# Patient Record
Sex: MALE | Race: AMERICAN INDIAN OR ALASKA NATIVE | Employment: FULL TIME | URBAN - METROPOLITAN AREA
[De-identification: names, ages, dates, MRNs, and addresses within clinical notes are randomized per-mention and may not be internally consistent; named-entity substitution may affect disease eponyms.]

---

## 2019-07-23 ENCOUNTER — HOSPITAL ENCOUNTER (EMERGENCY)
Age: 24
Discharge: HOME | End: 2019-07-23
Attending: EMERGENCY MEDICINE
Payer: COMMERCIAL

## 2019-07-23 ENCOUNTER — APPOINTMENT (OUTPATIENT)
Dept: GENERAL RADIOLOGY | Age: 24
End: 2019-07-23
Payer: COMMERCIAL

## 2019-07-23 VITALS
DIASTOLIC BLOOD PRESSURE: 81 MMHG | WEIGHT: 133 LBS | HEIGHT: 67 IN | RESPIRATION RATE: 16 BRPM | BODY MASS INDEX: 20.88 KG/M2 | TEMPERATURE: 97.7 F | HEART RATE: 83 BPM | SYSTOLIC BLOOD PRESSURE: 129 MMHG

## 2019-07-23 DIAGNOSIS — N34.2 URETHRITIS: ICD-10-CM

## 2019-07-23 DIAGNOSIS — K52.9 GASTROENTERITIS: Primary | ICD-10-CM

## 2019-07-23 LAB
AMORPHOUS: ABNORMAL /HPF
BACTERIA: ABNORMAL /HPF
BILIRUBIN URINE: NEGATIVE
BLOOD, URINE: NEGATIVE
CLARITY: ABNORMAL
COLOR: YELLOW
GLUCOSE URINE: NEGATIVE MG/DL
KETONES, URINE: NEGATIVE MG/DL
LEUKOCYTE ESTERASE, URINE: NEGATIVE
MICROSCOPIC EXAMINATION: YES
NITRITE, URINE: NEGATIVE
PH UA: 7.5 (ref 5–8)
PROTEIN UA: NEGATIVE MG/DL
RBC UA: ABNORMAL /HPF (ref 0–2)
SPECIFIC GRAVITY UA: 1.01 (ref 1–1.03)
URINE TYPE: ABNORMAL
UROBILINOGEN, URINE: 0.2 E.U./DL
WBC UA: ABNORMAL /HPF (ref 0–5)

## 2019-07-23 PROCEDURE — 87491 CHLMYD TRACH DNA AMP PROBE: CPT

## 2019-07-23 PROCEDURE — 74018 RADEX ABDOMEN 1 VIEW: CPT

## 2019-07-23 PROCEDURE — 99284 EMERGENCY DEPT VISIT MOD MDM: CPT

## 2019-07-23 PROCEDURE — 81001 URINALYSIS AUTO W/SCOPE: CPT

## 2019-07-23 PROCEDURE — 87591 N.GONORRHOEAE DNA AMP PROB: CPT

## 2019-07-23 RX ORDER — AZITHROMYCIN 250 MG/1
1000 TABLET, FILM COATED ORAL ONCE
Qty: 4 TABLET | Refills: 0 | Status: SHIPPED | OUTPATIENT
Start: 2019-07-23 | End: 2019-07-23

## 2019-07-23 RX ORDER — DICYCLOMINE HYDROCHLORIDE 10 MG/1
10 CAPSULE ORAL
Qty: 30 CAPSULE | Refills: 0 | Status: SHIPPED | OUTPATIENT
Start: 2019-07-23 | End: 2020-07-22

## 2019-07-23 RX ORDER — METRONIDAZOLE 500 MG/1
2000 TABLET ORAL ONCE
Qty: 4 TABLET | Refills: 0 | Status: SHIPPED | OUTPATIENT
Start: 2019-07-23 | End: 2019-07-23

## 2019-07-23 RX ORDER — CIPROFLOXACIN 500 MG/1
500 TABLET, FILM COATED ORAL ONCE
Qty: 1 TABLET | Refills: 0 | Status: SHIPPED | OUTPATIENT
Start: 2019-07-23 | End: 2019-07-23

## 2019-07-23 SDOH — HEALTH STABILITY: MENTAL HEALTH: HOW OFTEN DO YOU HAVE A DRINK CONTAINING ALCOHOL?: NEVER

## 2019-07-23 ASSESSMENT — PAIN DESCRIPTION - DESCRIPTORS: DESCRIPTORS: ACHING

## 2019-07-23 ASSESSMENT — PAIN SCALES - GENERAL: PAINLEVEL_OUTOF10: 8

## 2019-07-23 ASSESSMENT — PAIN DESCRIPTION - PAIN TYPE: TYPE: ACUTE PAIN

## 2019-07-23 ASSESSMENT — PAIN DESCRIPTION - LOCATION: LOCATION: ABDOMEN

## 2019-07-23 ASSESSMENT — PAIN DESCRIPTION - FREQUENCY: FREQUENCY: CONTINUOUS

## 2019-07-23 NOTE — ED TRIAGE NOTES
Pt c/o epigastric pain for four days after eating [employer].  + diarrhea several days ago, denies emesis. + dysuria.  EMD here

## 2019-07-23 NOTE — ED NOTES
Patient given d/c instructions with return verbalization including medications. Emphasis on f/u, to return with worsening s/s. Pt ambulated to lobby with steady gait.      Nimesh Aleman RN  07/23/19 9819

## 2019-07-26 LAB
C. TRACHOMATIS DNA ,URINE: NEGATIVE
N. GONORRHOEAE DNA, URINE: NEGATIVE

## 2020-10-23 LAB — H PYLORI ANTIGEN STOOL: NEGATIVE

## 2021-05-28 ENCOUNTER — APPOINTMENT (OUTPATIENT)
Dept: GENERAL RADIOLOGY | Age: 26
End: 2021-05-28
Payer: COMMERCIAL

## 2021-05-28 ENCOUNTER — HOSPITAL ENCOUNTER (EMERGENCY)
Age: 26
Discharge: HOME | End: 2021-05-28
Attending: EMERGENCY MEDICINE
Payer: COMMERCIAL

## 2021-05-28 VITALS
SYSTOLIC BLOOD PRESSURE: 112 MMHG | OXYGEN SATURATION: 99 % | BODY MASS INDEX: 23.1 KG/M2 | TEMPERATURE: 98 F | WEIGHT: 147.2 LBS | HEART RATE: 89 BPM | DIASTOLIC BLOOD PRESSURE: 68 MMHG | HEIGHT: 67 IN | RESPIRATION RATE: 14 BRPM

## 2021-05-28 DIAGNOSIS — S90.212A HEMATOMA, SUBUNGUAL, GREAT TOE, LEFT, INITIAL ENCOUNTER: ICD-10-CM

## 2021-05-28 DIAGNOSIS — S29.019D THORACIC MYOFASCIAL STRAIN, SUBSEQUENT ENCOUNTER: ICD-10-CM

## 2021-05-28 DIAGNOSIS — S90.212A CONTUSION OF LEFT GREAT TOE WITH DAMAGE TO NAIL, INITIAL ENCOUNTER: Primary | ICD-10-CM

## 2021-05-28 PROCEDURE — 73660 X-RAY EXAM OF TOE(S): CPT

## 2021-05-28 PROCEDURE — 99283 EMERGENCY DEPT VISIT LOW MDM: CPT

## 2021-05-28 PROCEDURE — 6370000000 HC RX 637 (ALT 250 FOR IP): Performed by: EMERGENCY MEDICINE

## 2021-05-28 RX ORDER — IBUPROFEN 600 MG/1
600 TABLET ORAL ONCE
Status: COMPLETED | OUTPATIENT
Start: 2021-05-28 | End: 2021-05-28

## 2021-05-28 RX ADMIN — IBUPROFEN 600 MG: 600 TABLET ORAL at 01:02

## 2021-05-28 ASSESSMENT — PAIN DESCRIPTION - ONSET
ONSET: SUDDEN
ONSET_2: PROGRESSIVE

## 2021-05-28 ASSESSMENT — PAIN DESCRIPTION - LOCATION
LOCATION_2: BACK
LOCATION: TOE (COMMENT WHICH ONE)

## 2021-05-28 ASSESSMENT — PAIN DESCRIPTION - ORIENTATION
ORIENTATION_2: MID
ORIENTATION: RIGHT

## 2021-05-28 ASSESSMENT — PAIN DESCRIPTION - INTENSITY
RATING_2: 4
RATING_2: 5

## 2021-05-28 ASSESSMENT — PAIN DESCRIPTION - PROGRESSION
CLINICAL_PROGRESSION_2: GRADUALLY WORSENING
CLINICAL_PROGRESSION: NOT CHANGED
CLINICAL_PROGRESSION: GRADUALLY IMPROVING
CLINICAL_PROGRESSION_2: GRADUALLY IMPROVING

## 2021-05-28 ASSESSMENT — PAIN DESCRIPTION - DESCRIPTORS
DESCRIPTORS: THROBBING
DESCRIPTORS_2: ACHING

## 2021-05-28 ASSESSMENT — PAIN SCALES - GENERAL
PAINLEVEL_OUTOF10: 5
PAINLEVEL_OUTOF10: 8
PAINLEVEL_OUTOF10: 8

## 2021-05-28 ASSESSMENT — PAIN DESCRIPTION - PAIN TYPE: TYPE: ACUTE PAIN

## 2021-05-28 ASSESSMENT — PAIN DESCRIPTION - DURATION: DURATION_2: INTERMITTENT

## 2021-05-28 ASSESSMENT — PAIN DESCRIPTION - FREQUENCY: FREQUENCY: CONTINUOUS

## 2021-05-28 NOTE — ED TRIAGE NOTES
C/O injury to right great toe while playing soccer yesterday. Also C/O pain in mid back. States he has had back pain x 2 months. Was seen at another [facility] and put on muscle relaxers.  Also has seen chiropractor and massage therapist.

## 2021-05-28 NOTE — ED PROVIDER NOTES
[facility] EMERGENCY DEPT VISIT      Patient Identification  Salbador Arizmendi is a 31 y.o. male. Chief Complaint   Toe Injury (Right great toe) and Back Pain (mid back pain x 2 months)      History of Present Illness: This is a  31 y.o. male who presents ambulatory  to the ED with complaints of left great toe pain after getting it stepped on while playing soccer this evening. Noted blood coming out from under his toenail. Hurts to touch. Able to ambulate. Also related upper back pain that radiates to shoulders and neck that has been an ongoing problem for 2 months. No new injury. Hurts to soraida certain ways. Seen in ED for this and placed on NSAIDS and muscle relaxants but states exercises help the most with the pain. He has seen chiropractor. History reviewed. No pertinent past medical history. History reviewed. No pertinent surgical history. No current facility-administered medications for this encounter. Current Outpatient Medications:     dicyclomine (BENTYL) 10 MG capsule, Take 1 capsule by mouth 4 times daily (before meals and nightly), Disp: 30 capsule, Rfl: 0    No Known Allergies    Social History     Socioeconomic History    Marital status: Single     Spouse name: Not on file    Number of children: Not on file    Years of education: Not on file    Highest education level: Not on file   Occupational History    Not on file   Tobacco Use    Smoking status: Never Smoker   Vaping Use    Vaping Use: Never used   Substance and Sexual Activity    Alcohol use: Never    Drug use: Never    Sexual activity: Not on file   Other Topics Concern    Not on file   Social History Narrative    Not on file     Social Determinants of Health     Financial Resource Strain:     Difficulty of Paying Living Expenses:    Food Insecurity:     Worried About Running Out of Food in the Last Year:     [address] in the Last Year:    Transportation Needs:     Lack of Transportation (Medical):      Lack of Transportation (Non-Medical):    Physical Activity:     Days of Exercise per Week:     Minutes of Exercise per Session:    Stress:     Feeling of Stress :    Social Connections:     Frequency of Communication with Friends and Family:     Frequency of Social Gatherings with Friends and Family:     Attends Yarsani Services:     Active Member of Clubs or Organizations:     Attends Club or Organization Meetings:     Marital Status:    Intimate Partner Violence:     Fear of Current or Ex-Partner:     Emotionally Abused:     Physically Abused:     Sexually Abused:        Nursing Notes Reviewed      ROS:  General: no fever  ENT: no sinus congestion, no sore throat  RESP: no cough, no shortness of breath  CARDIAC: no chest pain  GI: no abdominal pain, no vomiting, no diarrhea  Musculoskeletal: no arthralgia, + myalgia, + back pain,  no joint swelling  NEURO: no headache, no numbness, no weakness, no dizziness  DERM: no rash, no erythema, + ecchymosis, no wounds      PHYSICAL EXAM:  GENERAL APPEARANCE: Valeri Cowan is in no acute respiratory distress. Awake and alert. VITAL SIGNS:   ED Triage Vitals [05/28/21 0009]   Enc Vitals Group      /68      Pulse 92      Resp 14      Temp 98 °F (36.7 °C)      Temp Source Oral      SpO2 99 %      Weight 147 lb 3.2 oz (66.8 kg)      Height 5' 7\" (1.702 m)      Head Circumference       Peak Flow       Pain Score       Pain Loc       Pain Edu? Excl. in [address]? HEAD: Normocephalic, atraumatic. EYES:  Extraocular muscles are intact. Conjunctivas are pink. Negative scleral icterus. ENT:  Mucous membranes are moist.  Pharynx without erythema or exudates. NECK: Nontender and supple. CHEST: Clear to auscultation bilaterally. No rales, rhonchi, or wheezing. HEART:  Regular rate and rhythm. No murmurs. Strong and equal pulses in the upper and lower extremities. ABDOMEN: Soft,  nondistended, positive bowel sounds. abdomen is nontender.   MUSCULOSKELETAL: Active range of motion of the upper and lower extremities. No edema. Left great toe tender with mild swelling. Small proximal subungal hematoma. No active bleeding. No metatarsal tenderness. Upper thoracic spine tenderness midline and paravertebral but with good ROM. NEUROLOGICAL: Awake, alert and oriented x 3. Power intact in the upper and lower extremities. DERMATOLOGIC: No petechiae, rashes, or ecchymoses. ED COURSE AND MEDICAL DECISION MAKING:      Radiology:  All plain films have been evaluated by myself. They may have been overread by radiologist as noted in chart. Other radiologic studies (i.e. CT, MRI, ultrasounds, etc ) have been interpreted by radiologist.     XR TOE LEFT (MIN 2 VIEWS)   Final Result     No acute fracture. Labs:  No results found for this visit on 05/28/21. Treatment in the department:  Patient received   Medications   ibuprofen (ADVIL;MOTRIN) tablet 600 mg (600 mg Oral Given 5/28/21 0102)          Medical decision making:  Patient presents with left great toe pain after a crush injury. There is a very small subungual hematoma that is too small for drainage. X-rays are negative for fracture. He was offered a postop shoe for comfort and declined. Ice elevation and ibuprofen. He is also complaining of chronic upper back pain which has been ongoing issue for 2 months. There are no radicular symptoms into his arms or legs. The pain is worse with movement however he is exhibiting good range of motion here and states that he just got injured playing soccer and therefore I do not feel that he requires any x-ray imaging. This is likely muscular in nature and he is already on muscle relaxants. I have advised him to follow-up with PCP or orthopedics and perhaps physical therapy might be helpful.   I estimate there is LOW risk for FRACTURE, DISLOCATION, , SEPTIC ARTHRITIS, OSTEOMYELITIS, TENDON OR NEUROVASCULAR INJURY, thus I consider the discharge disposition reasonable. Elias Corcoran and I have discussed the diagnosis and risks, and we agree with discharging home to follow-up with their primary doctor or the referral orthopedist. We also discussed returning to the Emergency Department immediately if new or worsening symptoms occur. Clinical Impression:  1. Contusion of left great toe with damage to nail, initial encounter    2. Hematoma, subungual, great toe, left, initial encounter    3. Thoracic myofascial strain, subsequent encounter        Dispo:  Patient will be discharged  at this time. Patient was informed of this decision and agrees with plan. I have discussed lab and xray findings with patient and they understand. Questions were answered to the best of my ability. Discharge vitals:  Blood pressure 112/68, pulse 89, temperature 98 °F (36.7 °C), temperature source Oral, resp. rate 14, height 5' 7\" (1.702 m), weight 147 lb 3.2 oz (66.8 kg), SpO2 99 %. Prescriptions given:   Discharge Medication List as of 5/28/2021  1:22 AM            This chart was created using dragon voice recognition software.         Sneha Valenzuela MD  05/28/21 1017

## 2021-07-21 ENCOUNTER — HOSPITAL ENCOUNTER (OUTPATIENT)
Dept: PHYSICAL THERAPY | Age: 26
Setting detail: THERAPIES SERIES
Discharge: HOME | End: 2021-07-21
Payer: MEDICAID

## 2021-07-21 PROCEDURE — 97112 NEUROMUSCULAR REEDUCATION: CPT

## 2021-07-21 PROCEDURE — 97110 THERAPEUTIC EXERCISES: CPT

## 2021-07-21 PROCEDURE — 97161 PT EVAL LOW COMPLEX 20 MIN: CPT

## 2021-07-21 NOTE — PLAN OF CARE
[facility] - Outpatient Rehabilitation and Therapy,  [facility]  [address] [facility], [facility]  Phone: [phone]   Fax:     [phone]          Physical Therapy Certification    Dear Referring Practitioner: Dr. Vernon Banks,    We had the pleasure of evaluating the following patient for physical therapy services at [facility] and [facility]. A summary of our findings can be found in the initial assessment below. This includes our plan of care. If you have any questions or concerns regarding these findings, please do not hesitate to contact me at the office phone number checked above. Thank you for the referral.       Physician Signature:_______________________________Date:__________________  By signing above (or electronic signature), therapists plan is approved by physician            Patient: Ana Maria Navarro   : 1995   MRN: [id number]  Referring Physician: Referring Practitioner: Dr. Vernon Banks      Evaluation Date: 2021      Medical Diagnosis Information:  Diagnosis: Neck and back pain (M54.2 and M54.5)   Treatment Diagnosis: Neck pain (M54.2) Thoracic pain (M54.6), Lumbar pain (M54.5), Postural dysfunction (R29.3)                                         Insurance information: PT Insurance Information: [facility]    Precautions/ Contra-indications: none  Latex Allergy:  [x]NO      []YES  Preferred Language for Healthcare:   [x]English       []other:    C-SSRS Triggered by Intake questionnaire (Past 2 wk assessment ):   [x] No, Questionnaire did not trigger screening.   [] Yes, Patient intake triggered C-SSRS Screening      [] C-SSRS Screening completed  [] PCP notified via Epic     SUBJECTIVE: Patient stated complaint: Pt reports he started having pain in his neck/upper back 3-4 months ago. Has gone to ER a couple of times for this. Reports this is the first episode he has had of this pain. States he thinks it came on after doing a specific back work out and never got better. States he doesn't find much that gives him relief of pain. Has not been working out to avoid reinjury. 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[employer] Status/Prior Level of Function: Independent with ADLs, goes to gym, walking    OBJECTIVE:   Palpation: Tight bilat UTs, tender between scapulae    Functional Mobility/Transfers: no deficits    Posture: fwd head, rounded shoulders    Bandages/Dressings/Incisions: NA    Gait: (include devices/WB status):  WNL    CERV ROM     Cervical Flexion 60 deg pain R side    Cervical Extension 50 deg     Left Right   Cervical SB 50 deg stretch 40 deg stretch   Cervical rotation 75 deg 80 deg   Quadrant neg neg   Dorsal Glide      UE ROM Left Right   Shoulder Flex     Shoulder Abd     Shoulder ER     Shoulder IR     Elbow flex/ext     Wrist flex/ext/pro/sup     Finger flex/ext/opposition     Shoulder AROM WNL w OP     UE Strength  Left Right   Shoulder Flex 4+/5 4+/5   Shoulder Scap     Shoulder ABd (C5 Axillary) 4/5 4/5   Shoulder ER  4+/5 4+/5   Shoulder IR 4+/5 4+/5   Elbow Flex (C5 Musc) 4+/5 4+/5   Elbow Ext (C7 Radial) 4+/5 4+/5        Rhomboids 4-/5 4-/5   Mid trap 4-/5 4-/5   Low trap 3+/5 3+/5                     Reflexes Normal Abnormal Comments               S1-2 Seated achilles [] []    S1-2 Prone knee bend [] []    L3-4 Patellar tendon [] []    C5-6 Biceps [] []    C6 Brachioradialis [] []    C7-8 Triceps [] []      Reflexes/Sensation:    [x]Dermatomes/Myotomes intact    [x]Reflexes equal and normal bilaterally   []Other:    Joint Psychological Disorders  []Anxiety (F41.9)  []Depression (F32.9)   []Other:   []Other:          Barriers to/and or personal factors that will affect rehab potential:              []Age  []Sex   []Smoker              []Motivation/Lack of Motivation                        []Co-Morbidities              []Cognitive Function, education/learning barriers              []Environmental, home barriers              [x]profession/work barriers  []past PT/medical experience  []other:  Justification:  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 [] Falls Risk assessed and Patient requires intervention due to being higher risk   TUG score (>12s at risk):     [] Falls education provided, including     ASSESSMENT: Pt demos decreased postural awareness and decreased muscular endurance for seated activities. Would benefit from skilled PT to address these deficits to return to PLOF.     Functional Impairments:     [x]Noted cervical/thoracic/GHJ joint hypomobility   []Noted cervical/thoracic/GHJ joint hypermobility   [x]Decreased cervical/UE functional ROM   []Noted Headache pain aggravated by neck movements with/without dizziness   []Abnormal reflexes/sensation/myotomal/dermatomal deficits   [x]Decreased DCF control or ability to hold head up   []Decreased RC/scapular/core strength and neuromuscular control    [x]Decreased UE functional strength   [x]other: Dec postural strength     Functional Activity Limitations (from functional questionnaire and intake)   [x]Reduced ability to tolerate prolonged functional positions   []Reduced ability or difficulty with changes of positions or transfers between positions   [x]Reduced ability to maintain good posture and demonstrate good body mechanics with sitting, bending, and lifting   [x] Reduced ability or tolerance with driving and/or computer work   [x]Reduced ability to perform lifting, reaching, carrying tasks   []Reduced ability to concentrate   []Reduced ability to sleep    []Reduced ability to tolerate any impact through UE or spine   []Reduced ability to ambulate prolonged functional periods/distances   []other:    Participation Restrictions   []Reduced participation in self care activities   []Reduced participation in home management activities   [x]Reduced participation in work activities   [x]Reduced participation in social activities. [x]Reduced participation in sport/recreational activities. Classification/Subgrouping:   []signs/symptoms consistent with neck pain with mobility deficits     []signs/symptoms consistent with neck pain with movement coordinated impairments    []signs/symptoms consistent with neck pain with radiating pain    []signs/symptoms consistent with neck pain with headaches (cervicogenic)    []Signs/symptoms consistent with nerve root involvement including myotome & dermatome dysfunction   []sign/symptoms consistent with facet dysfunction of cervical and thoracic spine    []signs/symptoms consistent suggesting central cord compression/UMN syndromes   []signs/symptoms consistent with discogenic cervical pain   []signs/symptoms consistent with rib dysfunction   [x]signs/symptoms consistent with postural dysfunction   []signs/symptoms consistent with shoulder pathology    []signs/symptoms consistent with post-surgical status including decreased ROM, strength and function.    []signs/symptoms consistent with pathology which may benefit from Dry Needling   []signs/symptoms which may limit the use of advanced manual therapy techniques: (Elevated CV risk profile, recent trauma, intolerance to end range positions, prior TIA, visual issues, UE neurological compromise )     Prognosis/Rehab Potential:      []Excellent   [x]Good    []Fair   []Poor    Tolerance of evaluation/treatment:    []Excellent   [x]Good    []Fair   []Poor    Physical Therapy Evaluation Complexity Justification  [x] A history of present problem with:  [x] no personal factors and/or comorbidities that impact the plan of care;  []1-2 personal factors and/or comorbidities that impact the plan of care  []3 personal factors and/or comorbidities that impact the plan of care  [x] An examination of body systems using standardized tests and measures addressing any of the following: body structures and functions (impairments), activity limitations, and/or participation restrictions;:  [x] a total of 1-2 or more elements   [] a total of 3 or more elements   [] a total of 4 or more elements   [x] A clinical presentation with:  [x] stable and/or uncomplicated characteristics   [] evolving clinical presentation with changing characteristics  [] unstable and unpredictable characteristics;   [x] Clinical decision making of [x] low, [] moderate, [] high complexity using standardized patient assessment instrument and/or measurable assessment of functional outcome. [x] EVAL (LOW) 39737 (typically 20 minutes face-to-face)  [] EVAL (MOD) 41105 (typically 30 minutes face-to-face)  [] EVAL (HIGH) 02258 (typically 45 minutes face-to-face)  [] RE-EVAL     PLAN:   Frequency/Duration: 1-2 days per week for 4-6 Weeks:  Interventions:  [x]  Therapeutic exercise including: strength training, ROM, for cervical spine,scapula, core and Upper extremity, including postural re-education. [x]  NMR activation and proprioception for Deep cervical flexors, periscapular and RC muscles and Core, including postural re-education. [x]  Manual therapy as indicated for C/T spine, ribs, Soft tissue to include: Dry Needling/IASTM, STM, PROM, Gr I-IV mobilizations, manipulation. [x] Modalities as needed that may include: thermal agents, E-stim, Biofeedback, US, iontophoresis as indicated  [x] Patient education on joint protection, postural re-education, activity modification, progression of HEP.   [x] Aquatic exercise including: strength training, ROM, for cervical spine,scapula, core and Upper extremity, including postural re-education. HEP instruction:   Access Code: [email or web address]  URL: [email or web address]  Date: 07/21/2021  Prepared by: Areli Greene    GOALS:  Patient stated goal: \"Decrease pain\"  [] Progressing: [] Met: [] Not Met: [] Adjusted    Therapist goals for Patient:   Short Term Goals: To be achieved in: 2 weeks  1. Independent in HEP and progression per patient tolerance, in order to prevent re-injury. [] Progressing: [] Met: [] Not Met: [] Adjusted  2. Patient will have a decrease in pain to facilitate improvement in movement, function, and ADLs as indicated by Functional Deficits. [] Progressing: [] Met: [] Not Met: [] Adjusted    Long Term Goals: To be achieved in:  6 weeks  1. Disability index score of 3% or less for the [place] to assist with reaching prior level of function. **WILL TEST NDI NEXT VISIT  [] Progressing: [] Met: [] Not Met: [] Adjusted  2. Patient will demonstrate increased AROM to pain free cervical flexion, extension and side bending to allow for proper joint functioning as indicated by patients Functional Deficits. [] Progressing: [] Met: [] Not Met: [] Adjusted  3. Patient will demonstrate an increase in postural awareness and control and activation of  Deep cervical stabilizers as well as 4+/5 mid trap, low trap and rhomboids bilaterally to allow for proper functional mobility as indicated by patients Functional Deficits. [] Progressing: [] Met: [] Not Met: [] Adjusted  4. Patient will return to sitting at desk and driving for at least 45 min without increased symptoms or restriction. [] Progressing: [] Met: [] Not Met: [] Adjusted  5.  Patient will return to recreational workouts without increased symptoms or restriction (patient specific functional goal)    [] Progressing: [] Met: [] Not Met: [] Adjusted         Electronically signed by:  Alessandro Beltran, PT,DPT [id number]      Note: If patient does not return for scheduled/recommended follow up visits, this note will serve as a discharge from care along with the most recent update on progress.

## 2021-07-21 NOTE — FLOWSHEET NOTE
[facility], [facility]  [address] [facility] [place], [facility]  Phone: [phone]   Fax:     [phone]    Physical Therapy Treatment Note/ Progress Report:     Date:  2021    Patient Name:  Felix Allred    :  1995  MRN: [id number]    Pertinent Medical History:      Medical/Treatment Diagnosis Information:  · Diagnosis: Neck and back pain (M54.2 and M54.5)  · Treatment Diagnosis: Neck pain (M54.2) Thoracic pain (M54.6), Lumbar pain (M54.5), Postural dysfunction (S79.7)    Insurance/Certification information:  PT Insurance Information: [facility]  Physician Information:  Referring Practitioner: Dr. Corrina Packer of care signed (Y/N):     Date of Patient follow up with Physician:      Progress Report: []  Yes  [x]  No     Date Range for reporting period:  Beginnin2021  Ending:     Progress report due (10 Rx/or 30 days whichever is less): 60    Recertification due (POC duration/ or 90 days whichever is less): 21     Visit # Insurance/POC Allowable Auth Needed   1 30 []Yes    [x]No     Functional Outcomes Measure:   Date Assessed: at eval  Test: Sadie  Score: 6    **TAKE NDI NV**    Pain level:  6-8/10       SUBJECTIVE:  See eval    OBJECTIVE:    Observation:    Test measurements:      RESTRICTIONS/PRECAUTIONS: none    Exercises/Interventions:   Therapeutic Ex (95208)  Min: Resistance/Repetitions Notes   UT stretch B 30\" x 3 Cues for upright posture   Levator stretch B 30\" x 3 Cues for upright posture   Supine chin tuck 5\" x 15    Standing scap retraction 5\" x 15 Cues for scap mobility vs lumbar ext   HEP review 3'              Therapeutic Activity () Min:                          NMR re-education (67993) Min:      Postural awareness 5' Including written education handout via [employer], [employer] of towel roll at lumbar spine for driving                  Manual Intervention (50805)  Min:     STM upper traps  G1-2 t-spine PA mobs  G1-2 c-spine PA mobs (prone) 5'                                  Modalities  Min:                  Other Therapeutic Activities:  Pt was educated on PT POC, Diagnosis, Prognosis, pathomechanics as well as frequency and duration of scheduling future physical therapy appointments. Time was also taken on this day to answer all patient questions and participation in PT. Reviewed appointment policy in detail with patient and patient verbalized understanding. Home Exercise Program:Patient was instructed in the following for HEP:     .  HEP instruction:   Access Code: [email or web address]  URL: [employer]/  Date: 07/21/2021  Prepared by: Daksha Dean     Patient verbalized/demonstrated understanding and was issued written handout. Therapeutic Exercise and NMR EXR  [] (40343) Provided verbal/tactile cueing for activities related to strengthening, flexibility, endurance, ROM  for improvements in cervical, postural, scapular, scapulothoracic and UE control with self care, reaching, carrying, lifting, house/yardwork, driving/computer work.    [] (63194) Provided verbal/tactile cueing for activities related to improving balance, coordination, kinesthetic sense, posture, motor skill, proprioception  to assist with cervical, scapular, scapulothoracic and UE control with self care, reaching, carrying, lifting, house/yardwork, driving/computer work. Therapeutic Activities:    [] (05798 or 71301) Provided verbal/tactile cueing for activities related to improving balance, coordination, kinesthetic sense, posture, motor skill, proprioception and motor activation to allow for proper function of cervical, scapular, scapulothoracic and UE control with self care, carrying, lifting, driving/computer work.      Home Exercise Program:    [] (68130) Reviewed/Progressed HEP activities related to strengthening, flexibility, endurance, ROM of cervical, scapular, scapulothoracic and UE control with self care, reaching, carrying, lifting, house/yardwork, driving/computer work  [] (99599) Reviewed/Progressed HEP activities related to improving balance, coordination, kinesthetic sense, posture, motor skill, proprioception of cervical, scapular, scapulothoracic and UE control with self care, reaching, carrying, lifting, house/yardwork, driving/computer work      Manual Treatments:  PROM / STM / Oscillations-Mobs:  G-I, II, III, IV (PA's, Inf., Post.)  [] ([id number]) Provided manual therapy to mobilize soft tissue/joints of cervical/CT, scapular GHJ and UE for the purpose of decreasing headache, modulating pain, promoting relaxation,  increasing ROM, reducing/eliminating soft tissue swelling/inflammation/restriction, improving soft tissue extensibility and allowing for proper ROM for normal function with self care, reaching, carrying, lifting, house/yardwork, driving/computer work    If [place] Please Indicate Time In/Out  CPT Code Time in Time out                                   Approval Dates:  CPT Code Units Approved Units Used  Date Updated:                     Charges:  Timed Code Treatment Minutes: 25   Total Treatment Minutes: 45     [x] EVAL (LOW) 17444 (typically 20 minutes face-to-face)  [] EVAL (MOD) 97237 (typically 30 minutes face-to-face)  [] EVAL (HIGH) 27203 (typically 45 minutes face-to-face)  [] RE-EVAL     [x] GZ(64317) x 1    [] Dry needle 1 or 2 Muscles (08217)  [x] NMR (72002) x 1    [] Dry needle 3+ Muscles (84553)  [] Manual (43081) x     [] Ultrasound (46704) x  [] TA (36932) x     [] Mech Traction (56113)  [] ES(attended) (87901)     [] ES (un) (03265):   [] Vasopump (20198) [] Ionto (78838)   [] Other:      GOALS:  Patient stated goal: \"Decrease pain\"  []? Progressing: []? Met: []? Not Met: []? Adjusted     Therapist goals for Patient:   Short Term Goals: To be achieved in: 2 weeks  1.  Independent in HEP and progression per patient tolerance, in order to prevent re-injury. []? Progressing: []? Met: []? Not Met: []? Adjusted  2. Patient will have a decrease in pain to facilitate improvement in movement, function, and ADLs as indicated by Functional Deficits. []? Progressing: []? Met: []? Not Met: []? Adjusted     Long Term Goals: To be achieved in:  6 weeks  1. Disability index score of 3% or less for the [place] to assist with reaching prior level of function. **WILL TEST NDI NEXT VISIT  []? Progressing: []? Met: []? Not Met: []? Adjusted  2. Patient will demonstrate increased AROM to pain free cervical flexion, extension and side bending to allow for proper joint functioning as indicated by patients Functional Deficits. []? Progressing: []? Met: []? Not Met: []? Adjusted  3. Patient will demonstrate an increase in postural awareness and control and activation of  Deep cervical stabilizers as well as 4+/5 mid trap, low trap and rhomboids bilaterally to allow for proper functional mobility as indicated by patients Functional Deficits. []? Progressing: []? Met: []? Not Met: []? Adjusted  4. Patient will return to sitting at desk and driving for at least 45 min without increased symptoms or restriction. []? Progressing: []? Met: []? Not Met: []? Adjusted  5. Patient will return to recreational workouts without increased symptoms or restriction (patient specific functional goal)    []? Progressing: []? Met: []? Not Met: []? Adjusted       ASSESSMENT:  See eval    Treatment/Activity Tolerance:  [x] Patient tolerated treatment well [] Patient limited by fatique  [] Patient limited by pain  [] Patient limited by other medical complications  [] Other:     Overall Progression Towards Functional goals/ Treatment Progress Update:  [] Patient is progressing as expected towards functional goals listed. [] Progression is slowed due to complexities/Impairments listed. [] Progression has been slowed due to co-morbidities.   [x] Plan just implemented, too soon to assess goals progression <30days   [] Goals require adjustment due to lack of progress  [] Patient is not progressing as expected and requires additional follow up with physician  [] Other    Prognosis for POC: [x] Good [] Fair  [] Poor    Patient requires continued skilled intervention: [x] Yes  [] No        PLAN: See eval  [] Continue per plan of care [] Alter current plan (see comments)  [x] Plan of care initiated [] Hold pending MD visit [] Discharge    Electronically signed by: Carlos Dash, PT,DPT [id number]    Note: If patient does not return for scheduled/recommended follow up visits, this note will serve as a discharge from care along with the most recent update on progress.

## 2021-07-26 ENCOUNTER — HOSPITAL ENCOUNTER (OUTPATIENT)
Dept: PHYSICAL THERAPY | Age: 26
Setting detail: THERAPIES SERIES
Discharge: HOME | End: 2021-07-26
Payer: MEDICAID

## 2021-07-26 PROCEDURE — 97110 THERAPEUTIC EXERCISES: CPT

## 2021-07-26 PROCEDURE — 97140 MANUAL THERAPY 1/> REGIONS: CPT

## 2021-07-26 NOTE — FLOWSHEET NOTE
[facility]  [address] [facility]  Phone: [phone]   Fax:     [phone]    Physical Therapy Treatment Note/ Progress Report:     Date:  2021    Patient Name:  Bella Waldrop    :  1995  MRN: [id number]    Pertinent Medical History:      Medical/Treatment Diagnosis Information:  · Diagnosis: Neck and back pain (M54.2 and M54.5)  · Treatment Diagnosis: Neck pain (M54.2) Thoracic pain (M54.6), Lumbar pain (M54.5), Postural dysfunction (L89.1)    Insurance/Certification information:  PT Insurance Information: [facility]  Physician Information:  Referring Practitioner: Dr. Terry Dotson of care signed (Y/N):     Date of Patient follow up with Physician:      Progress Report: []  Yes  [x]  No     Date Range for reporting period:  Beginnin2021  Ending:     Progress report due (10 Rx/or 30 days whichever is less):     Recertification due (POC duration/ or 90 days whichever is less): 21     Visit # Insurance/POC Allowable Auth Needed   2 30 []Yes    [x]No     Functional Outcomes Measure:   Date Assessed: at eval  Test: Janicean  Score: 6    **TAKE NDI NV**    Pain level:  5/10       SUBJECTIVE:  Pt states he feels some better- no pain in neck, more sore through shoulder blade region of spine.     OBJECTIVE:    Observation:    Test measurements:      RESTRICTIONS/PRECAUTIONS: none    Exercises/Interventions:   Therapeutic Ex (08065)  Min: Resistance/Repetitions Notes   Cues for upright posture   Cues for upright posture   Supine chin tuck 5\" x 15    Standing scap retraction 5\" x 20 Cues for scap mobility vs lumbar ext- edu to perform against wall at home   Sidelying t-spine open book 5\" x 10 ea    Cat/cow 5\" x 10 ea         Red TB row 2 x 10 Cues for scap   Yellow TB shld ext 2 x 10 Cues for posture        Doorway 30\" x 3              Therapeutic Activity (61941) Min:                          NMR re-education (87748) Min:                     Manual Intervention (87264)  Min:     STM upper traps  G1-2 t-spine PA mobs  G1-2 c-spine PA mobs (prone)  STM erector spinae and scapular muscles 12' Tight on R mid tspine                                 Modalities  Min:                  Other Therapeutic Activities:  Pt was educated on PT POC, Diagnosis, Prognosis, pathomechanics as well as frequency and duration of scheduling future physical therapy appointments. Time was also taken on this day to answer all patient questions and participation in PT. Reviewed appointment policy in detail with patient and patient verbalized understanding. Home Exercise Program:Patient was instructed in the following for HEP:     .  HEP instruction:   Access Code: [email or web address]  URL: [email or web address]  Date: 07/21/2021  Prepared by: Maria Fernanda Pacheco    Access Code: [email or web address]  URL: [email or web address]  Date: 07/26/2021  Prepared by: Maria Fernanda Pacheco     Patient verbalized/demonstrated understanding and was issued written handout. Therapeutic Exercise and NMR EXR  [x] (08900) Provided verbal/tactile cueing for activities related to strengthening, flexibility, endurance, ROM  for improvements in cervical, postural, scapular, scapulothoracic and UE control with self care, reaching, carrying, lifting, house/yardwork, driving/computer work.    [] (46577) Provided verbal/tactile cueing for activities related to improving balance, coordination, kinesthetic sense, posture, motor skill, proprioception  to assist with cervical, scapular, scapulothoracic and UE control with self care, reaching, carrying, lifting, house/yardwork, driving/computer work.     Therapeutic Activities:    [] (15485 or 88142) Provided verbal/tactile cueing for activities related to improving balance, coordination, kinesthetic sense, posture, motor skill, proprioception and motor activation to allow for proper function of cervical, scapular, scapulothoracic and UE control with self care, carrying, lifting, driving/computer work.      Home Exercise Program:    [x] (32960) Reviewed/Progressed HEP activities related to strengthening, flexibility, endurance, ROM of cervical, scapular, scapulothoracic and UE control with self care, reaching, carrying, lifting, house/yardwork, driving/computer work  [] (90648) Reviewed/Progressed HEP activities related to improving balance, coordination, kinesthetic sense, posture, motor skill, proprioception of cervical, scapular, scapulothoracic and UE control with self care, reaching, carrying, lifting, house/yardwork, driving/computer work      Manual Treatments:  PROM / STM / Oscillations-Mobs:  G-I, II, III, IV (PA's, Inf., Post.)  [x] (47191) Provided manual therapy to mobilize soft tissue/joints of cervical/CT, scapular GHJ and UE for the purpose of decreasing headache, modulating pain, promoting relaxation,  increasing ROM, reducing/eliminating soft tissue swelling/inflammation/restriction, improving soft tissue extensibility and allowing for proper ROM for normal function with self care, reaching, carrying, lifting, house/yardwork, driving/computer work    If [place] Please Indicate Time In/Out  CPT Code Time in Time out                                   Approval Dates:  CPT Code Units Approved Units Used  Date Updated:                     Charges:  Timed Code Treatment Minutes: 40   Total Treatment Minutes: 40     [] EVAL (LOW) 52807 (typically 20 minutes face-to-face)  [] EVAL (MOD) 77735 (typically 30 minutes face-to-face)  [] EVAL (HIGH) 89839 (typically 45 minutes face-to-face)  [] RE-EVAL     [x] AO(29865) x2   [] Dry needle 1 or 2 Muscles (50507)  [] NMR (59692) x    [] Dry needle 3+ Muscles (48086)  [x] Manual (43192) x 1    [] Ultrasound (21649) x  [] TA (94310) x     [] Mech Traction (61894)  [] ES(attended) (51134)     [] ES (un) (25607):   [] Vasopump (02972) [] extension and postural awareness throughout session. Treatment/Activity Tolerance:  [x] Patient tolerated treatment well [] Patient limited by fatique  [] Patient limited by pain  [] Patient limited by other medical complications  [] Other:     Overall Progression Towards Functional goals/ Treatment Progress Update:  [] Patient is progressing as expected towards functional goals listed. [] Progression is slowed due to complexities/Impairments listed. [] Progression has been slowed due to co-morbidities. [x] Plan just implemented, too soon to assess goals progression <30days   [] Goals require adjustment due to lack of progress  [] Patient is not progressing as expected and requires additional follow up with physician  [] Other    Prognosis for POC: [x] Good [] Fair  [] Poor    Patient requires continued skilled intervention: [x] Yes  [] No        PLAN: Postural strength  [x] Continue per plan of care [] Alter current plan (see comments)  [] Plan of care initiated [] Hold pending MD visit [] Discharge    Electronically signed by: Cande Franklin PT,DPT [id number]    Note: If patient does not return for scheduled/recommended follow up visits, this note will serve as a discharge from care along with the most recent update on progress.

## 2021-07-28 ENCOUNTER — HOSPITAL ENCOUNTER (OUTPATIENT)
Dept: PHYSICAL THERAPY | Age: 26
Setting detail: THERAPIES SERIES
Discharge: HOME | End: 2021-07-28
Payer: MEDICAID

## 2021-07-28 NOTE — FLOWSHEET NOTE
[facility], [facility]  [address] [facility]  Phone: [phone]   Fax:     [phone]    Physical Therapy  Cancellation/No-show Note  Patient Name:  Claudette Holm  :  1995   Date:  2021  Cancelled visits to date: 0  No-shows to date: 1    Patient status for today's appointment patient:  []  Cancelled  []  Rescheduled appointment  [x]  No-show     Reason given by patient:  []  Patient ill  []  Conflicting appointment  []  No transportation    []  Conflict with work  [x]  No reason given  []  Other:     Comments:      Phone call information:   []  Phone call made today to patient at _ time at number provided:      []  Patient answered, conversation as follows:    []  Patient did not answer, message left as follows:  []  Phone call not made today    Electronically signed by:  Jorge Yun, PT

## 2021-08-02 ENCOUNTER — HOSPITAL ENCOUNTER (OUTPATIENT)
Dept: PHYSICAL THERAPY | Age: 26
Setting detail: THERAPIES SERIES
Discharge: HOME | End: 2021-08-02
Payer: MEDICAID

## 2021-08-02 PROCEDURE — 97110 THERAPEUTIC EXERCISES: CPT

## 2021-08-02 PROCEDURE — 97140 MANUAL THERAPY 1/> REGIONS: CPT

## 2021-08-02 NOTE — FLOWSHEET NOTE
[facility]  [address] [facility]  Phone: [phone]   Fax:     [phone]    Physical Therapy Treatment Note/ Progress Report:     Date:  2021    Patient Name:  Cortez Nunez    :  1995  MRN: [id number]    Pertinent Medical History:      Medical/Treatment Diagnosis Information:  · Diagnosis: Neck and back pain (M54.2 and M54.5)  · Treatment Diagnosis: Neck pain (M54.2) Thoracic pain (M54.6), Lumbar pain (M54.5), Postural dysfunction (I95.0)    Insurance/Certification information:  PT Insurance Information: [facility]  Physician Information:  Referring Practitioner: Dr. Kev Edwards of care signed (Y/N):     Date of Patient follow up with Physician:      Progress Report: []  Yes  [x]  No     Date Range for reporting period:  Beginnin2021  Ending:     Progress report due (10 Rx/or 30 days whichever is less): 64    Recertification due (POC duration/ or 90 days whichever is less): 21     Visit # Insurance/POC Allowable Auth Needed   3 30 []Yes    [x]No     Functional Outcomes Measure:   Date Assessed: at eval  Test: [place]  Score: 6    **TAKE NDI NV**    Pain level: 4/10       SUBJECTIVE:  Pt states he feels some better- no pain in neck, more sore through shoulder blade region of spine.     OBJECTIVE:    Observation: trigger points noted through R levator   Test measurements:      RESTRICTIONS/PRECAUTIONS: none    Exercises/Interventions:   Therapeutic Ex (09009)  Min: Resistance/Repetitions Notes   UT stretch B 30\" x 3    Levator stretch B 30\" x 3    Supine chin tuck 5\" x 15    Sidelying t-spine open book 5\" x 10 ea    Cat/cow 5\" x 10 ea         Red TB row 2 x 10 Cues for scap   Yellow TB shld ext 2 x 10 Cues for posture        Doorway 30\" x 3 Cues for form             Therapeutic Activity () Min:                          NMR re-education (28566) Min:                          Manual Intervention (45935)  Min:     STM/trigger point release upper traps/levator  G1-2 t-spine PA mobs  G1-2 c-spine PA mobs (prone)  STM erector spinae and scapular muscles 12' Tight on R mid tspine                                 Modalities  Min:                  Other Therapeutic Activities:  Pt was educated on PT POC, Diagnosis, Prognosis, pathomechanics as well as frequency and duration of scheduling future physical therapy appointments. Time was also taken on this day to answer all patient questions and participation in PT. Reviewed appointment policy in detail with patient and patient verbalized understanding. Home Exercise Program:Patient was instructed in the following for HEP:     .  HEP instruction:   Access Code: [email or web address]  URL: [email or web address]/  Date: 07/21/2021  Prepared by: Leopold Catching    Access Code: [email or web address]  URL: [email or web address]  Date: 07/26/2021  Prepared by: Leopold Catching     Patient verbalized/demonstrated understanding and was issued written handout. Therapeutic Exercise and NMR EXR  [x] (26169) Provided verbal/tactile cueing for activities related to strengthening, flexibility, endurance, ROM  for improvements in cervical, postural, scapular, scapulothoracic and UE control with self care, reaching, carrying, lifting, house/yardwork, driving/computer work.    [] (87520) Provided verbal/tactile cueing for activities related to improving balance, coordination, kinesthetic sense, posture, motor skill, proprioception  to assist with cervical, scapular, scapulothoracic and UE control with self care, reaching, carrying, lifting, house/yardwork, driving/computer work.     Therapeutic Activities:    [] (97233 or 44593) Provided verbal/tactile cueing for activities related to improving balance, coordination, kinesthetic sense, posture, motor skill, proprioception and motor activation to allow for proper function of cervical, scapular, scapulothoracic and UE control with self care, carrying, lifting, driving/computer work.      Home Exercise Program:    [x] (89330) Reviewed/Progressed HEP activities related to strengthening, flexibility, endurance, ROM of cervical, scapular, scapulothoracic and UE control with self care, reaching, carrying, lifting, house/yardwork, driving/computer work  [] (69937) Reviewed/Progressed HEP activities related to improving balance, coordination, kinesthetic sense, posture, motor skill, proprioception of cervical, scapular, scapulothoracic and UE control with self care, reaching, carrying, lifting, house/yardwork, driving/computer work      Manual Treatments:  PROM / STM / Oscillations-Mobs:  G-I, II, III, IV (PA's, Inf., Post.)  [x] (75033) Provided manual therapy to mobilize soft tissue/joints of cervical/CT, scapular GHJ and UE for the purpose of decreasing headache, modulating pain, promoting relaxation,  increasing ROM, reducing/eliminating soft tissue swelling/inflammation/restriction, improving soft tissue extensibility and allowing for proper ROM for normal function with self care, reaching, carrying, lifting, house/yardwork, driving/computer work    If [facility] Please Indicate Time In/Out  CPT Code Time in Time out                                   Approval Dates:  CPT Code Units Approved Units Used  Date Updated:                     Charges:  Timed Code Treatment Minutes: 40   Total Treatment Minutes: 40     [] EVAL (LOW) 55196 (typically 20 minutes face-to-face)  [] EVAL (MOD) 12687 (typically 30 minutes face-to-face)  [] EVAL (HIGH) 63702 (typically 45 minutes face-to-face)  [] RE-EVAL     [x] XJ(18142) x2   [] Dry needle 1 or 2 Muscles (94386)  [] NMR (58370) x    [] Dry needle 3+ Muscles (60341)  [x] Manual (94053) x 1    [] Ultrasound (01033) x  [] TA (46583) x     [] Mech Traction (11640)  [] ES(attended) (83092)     [] ES (un) (99535):   [] Vasopump (82701) [] Ionto (41041)   [] Other:      GOALS:  Patient stated goal: \"Decrease pain\"  []? Progressing: []? Met: []? Not Met: []? Adjusted     Therapist goals for Patient:   Short Term Goals: To be achieved in: 2 weeks  1. Independent in HEP and progression per patient tolerance, in order to prevent re-injury. []? Progressing: []? Met: []? Not Met: []? Adjusted  2. Patient will have a decrease in pain to facilitate improvement in movement, function, and ADLs as indicated by Functional Deficits. []? Progressing: []? Met: []? Not Met: []? Adjusted     Long Term Goals: To be achieved in:  6 weeks  1. Disability index score of 3% or less for the [place] to assist with reaching prior level of function. **WILL TEST NDI NEXT VISIT  []? Progressing: []? Met: []? Not Met: []? Adjusted  2. Patient will demonstrate increased AROM to pain free cervical flexion, extension and side bending to allow for proper joint functioning as indicated by patients Functional Deficits. []? Progressing: []? Met: []? Not Met: []? Adjusted  3. Patient will demonstrate an increase in postural awareness and control and activation of  Deep cervical stabilizers as well as 4+/5 mid trap, low trap and rhomboids bilaterally to allow for proper functional mobility as indicated by patients Functional Deficits. []? Progressing: []? Met: []? Not Met: []? Adjusted  4. Patient will return to sitting at desk and driving for at least 45 min without increased symptoms or restriction. []? Progressing: []? Met: []? Not Met: []? Adjusted  5. Patient will return to recreational workouts without increased symptoms or restriction (patient specific functional goal)    []? Progressing: []? Met: []? Not Met: []? Adjusted         ASSESSMENT:  Pt did well with therex focused on scapular work today. Requires cueing frequently for posture though it is improving compared to the first day.   Discussed importance of log rolling when getting up versus using momentum to sit up to prevent worsening back pain. Treatment/Activity Tolerance:  [x] Patient tolerated treatment well [] Patient limited by fatique  [] Patient limited by pain  [] Patient limited by other medical complications  [] Other:     Overall Progression Towards Functional goals/ Treatment Progress Update:  [] Patient is progressing as expected towards functional goals listed. [] Progression is slowed due to complexities/Impairments listed. [] Progression has been slowed due to co-morbidities. [x] Plan just implemented, too soon to assess goals progression <30days   [] Goals require adjustment due to lack of progress  [] Patient is not progressing as expected and requires additional follow up with physician  [] Other    Prognosis for POC: [x] Good [] Fair  [] Poor    Patient requires continued skilled intervention: [x] Yes  [] No        PLAN: Postural strength  [x] Continue per plan of care [] Alter current plan (see comments)  [] Plan of care initiated [] Hold pending MD visit [] Discharge    Electronically signed by: Carlos Dash PT,DPT [id number]    Note: If patient does not return for scheduled/recommended follow up visits, this note will serve as a discharge from care along with the most recent update on progress.

## 2021-08-04 ENCOUNTER — HOSPITAL ENCOUNTER (OUTPATIENT)
Dept: PHYSICAL THERAPY | Age: 26
Setting detail: THERAPIES SERIES
Discharge: HOME | End: 2021-08-04
Payer: MEDICAID

## 2021-08-04 PROCEDURE — 97140 MANUAL THERAPY 1/> REGIONS: CPT

## 2021-08-04 PROCEDURE — 97110 THERAPEUTIC EXERCISES: CPT

## 2021-08-04 NOTE — FLOWSHEET NOTE
[facility]  [address] [facility] [place], [facility]  Phone: [phone]   Fax:     [phone]    Physical Therapy Treatment Note/ Progress Report:     Date:  2021    Patient Name:  Janae Ma    :  1995  MRN: [id number]    Pertinent Medical History:      Medical/Treatment Diagnosis Information:  · Diagnosis: Neck and back pain (M54.2 and M54.5)  · Treatment Diagnosis: Neck pain (M54.2) Thoracic pain (M54.6), Lumbar pain (M54.5), Postural dysfunction (L50.2)    Insurance/Certification information:  PT Insurance Information: [facility]  Physician Information:  Referring Practitioner: Dr. Alexy Abreu of care signed (Y/N):     Date of Patient follow up with Physician:      Progress Report: []  Yes  [x]  No     Date Range for reporting period:  Beginnin2021  Ending:     Progress report due (10 Rx/or 30 days whichever is less):     Recertification due (POC duration/ or 90 days whichever is less): 21     Visit # Insurance/POC Allowable Auth Needed   4 30 []Yes    [x]No     Functional Outcomes Measure:   Date Assessed: at eval  Test: [place]  Score: 6    **TAKE NDI NV**    Pain level: 3/10 neck,   5/10 scapular/ thoracic       SUBJECTIVE:  21 reports improvement. Interscapular region worse than neck. Increased pain with sitting/ driving.      OBJECTIVE:    Observation: trigger points noted through R levator   Test measurements:      RESTRICTIONS/PRECAUTIONS: none    Exercises/Interventions:   Therapeutic Ex (22992)  Min: Resistance/Repetitions Notes   UT stretch B 30\" x 3    Levator stretch B 30\" x 3    Supine chin tuck 5\" x 15    Sidelying t-spine open book 5\" x 10 ea R/L    Cat/cow 5\" x 10 ea         Red TB row 2 x 10 Cues for scap   Yellow TB shld ext 2 x 10 Cues for posture        Doorway 30\" x 3 Cues for form             Therapeutic Activity (96632) Min: Discussed posture/ tips with posture, positioning with computer/ studying, reading, sitting driving. Issued handout on computer ergonomics                        NMR re-education (16088) Min:                          Manual Intervention (07865)  Min:     Crystal Johnson point release upper traps/levator  G1-2 t-spine PA mobs  G1-2 c-spine PA mobs (prone)  STM erector spinae and scapular muscles 15 min Tight on R mid tspine   no pain after Rx                                 Modalities  Min:                  Other Therapeutic Activities:  Pt was educated on PT POC, Diagnosis, Prognosis, pathomechanics as well as frequency and duration of scheduling future physical therapy appointments. Time was also taken on this day to answer all patient questions and participation in PT. Reviewed appointment policy in detail with patient and patient verbalized understanding. Home Exercise Program:Patient was instructed in the following for HEP:     .  HEP instruction:   Access Code: [email or web address]  URL: [email or web address]  Date: 07/21/2021  Prepared by: Moy Hernández    Access Code: [email or web address]  URL: [email or web address]  Date: 07/26/2021  Prepared by: Moy Hernández     Patient verbalized/demonstrated understanding and was issued written handout. Therapeutic Exercise and NMR EXR  [x] (41982) Provided verbal/tactile cueing for activities related to strengthening, flexibility, endurance, ROM  for improvements in cervical, postural, scapular, scapulothoracic and UE control with self care, reaching, carrying, lifting, house/yardwork, driving/computer work.    [] (41516) Provided verbal/tactile cueing for activities related to improving balance, coordination, kinesthetic sense, posture, motor skill, proprioception  to assist with cervical, scapular, scapulothoracic and UE control with self care, reaching, carrying, lifting, house/yardwork, driving/computer work.     Therapeutic Activities:    [] (97891 or 26855) Provided verbal/tactile cueing for activities related to improving balance, coordination, kinesthetic sense, posture, motor skill, proprioception and motor activation to allow for proper function of cervical, scapular, scapulothoracic and UE control with self care, carrying, lifting, driving/computer work.      Home Exercise Program:    [x] (01132) Reviewed/Progressed HEP activities related to strengthening, flexibility, endurance, ROM of cervical, scapular, scapulothoracic and UE control with self care, reaching, carrying, lifting, house/yardwork, driving/computer work  [] (65102) Reviewed/Progressed HEP activities related to improving balance, coordination, kinesthetic sense, posture, motor skill, proprioception of cervical, scapular, scapulothoracic and UE control with self care, reaching, carrying, lifting, house/yardwork, driving/computer work      Manual Treatments:  PROM / STM / Oscillations-Mobs:  G-I, II, III, IV (PA's, Inf., Post.)  [x] (41653) Provided manual therapy to mobilize soft tissue/joints of cervical/CT, scapular GHJ and UE for the purpose of decreasing headache, modulating pain, promoting relaxation,  increasing ROM, reducing/eliminating soft tissue swelling/inflammation/restriction, improving soft tissue extensibility and allowing for proper ROM for normal function with self care, reaching, carrying, lifting, house/yardwork, driving/computer work            Charges:  Timed Code Treatment Minutes: 0   Total Treatment Minutes: 40     [] EVAL (LOW) 05321 (typically 20 minutes face-to-face)  [] EVAL (MOD) 40963 (typically 30 minutes face-to-face)  [] EVAL (HIGH) 25527 (typically 45 minutes face-to-face)  [] RE-EVAL     [x] ZY(22802) x2   [] Dry needle 1 or 2 Muscles (00627)  [] NMR (97918) x    [] Dry needle 3+ Muscles (93695)  [x] Manual (32609) x 1    [] Ultrasound (74265) x  [] TA (66286) x     [] Mech Traction (06182)  [] ES(attended) (06575)     [] ES (un) (07699):   [] Vasopump (46553) [] prevent worsening back pain. Treatment/Activity Tolerance:  [x] Patient tolerated treatment well [] Patient limited by fatique  [] Patient limited by pain  [] Patient limited by other medical complications  [] Other:     Overall Progression Towards Functional goals/ Treatment Progress Update:  [] Patient is progressing as expected towards functional goals listed. [] Progression is slowed due to complexities/Impairments listed. [] Progression has been slowed due to co-morbidities. [x] Plan just implemented, too soon to assess goals progression <30days   [] Goals require adjustment due to lack of progress  [] Patient is not progressing as expected and requires additional follow up with physician  [] Other    Prognosis for POC: [x] Good [] Fair  [] Poor    Patient requires continued skilled intervention: [x] Yes  [] No        PLAN: Postural strength   pt going out of town, to F/U with MD and call afterwards. [x] Continue per plan of care [] Alter current plan (see comments)  [] Plan of care initiated [] Hold pending MD visit [] Discharge    Electronically signed by: Gianluca Lloyd,     Note: If patient does not return for scheduled/recommended follow up visits, this note will serve as a discharge from care along with the most recent update on progress.